# Patient Record
Sex: FEMALE | Race: WHITE | Employment: FULL TIME | URBAN - METROPOLITAN AREA
[De-identification: names, ages, dates, MRNs, and addresses within clinical notes are randomized per-mention and may not be internally consistent; named-entity substitution may affect disease eponyms.]

---

## 2019-02-13 ENCOUNTER — HOSPITAL ENCOUNTER (OUTPATIENT)
Age: 29
Discharge: HOME | End: 2019-02-13
Attending: FAMILY MEDICINE
Payer: COMMERCIAL

## 2019-02-13 VITALS
HEART RATE: 61 BPM | TEMPERATURE: 98 F | WEIGHT: 180 LBS | RESPIRATION RATE: 16 BRPM | BODY MASS INDEX: 31.89 KG/M2 | HEIGHT: 63 IN | SYSTOLIC BLOOD PRESSURE: 119 MMHG | DIASTOLIC BLOOD PRESSURE: 74 MMHG | OXYGEN SATURATION: 99 %

## 2019-02-13 DIAGNOSIS — S61.215A LACERATION OF LEFT RING FINGER WITHOUT FOREIGN BODY, NAIL DAMAGE STATUS UNSPECIFIED, INITIAL ENCOUNTER: Primary | ICD-10-CM

## 2019-02-13 PROCEDURE — 99203 OFFICE O/P NEW LOW 30 MIN: CPT

## 2019-02-13 PROCEDURE — 12001 RPR S/N/AX/GEN/TRNK 2.5CM/<: CPT

## 2019-02-13 RX ORDER — AMOXICILLIN AND CLAVULANATE POTASSIUM 875; 125 MG/1; MG/1
1 TABLET, FILM COATED ORAL 2 TIMES DAILY
Qty: 14 TABLET | Refills: 0 | Status: SHIPPED | OUTPATIENT
Start: 2019-02-13 | End: 2019-02-20

## 2019-02-14 NOTE — ED INITIAL ASSESSMENT (HPI)
Today patient was giving her dog a treat and the dog accidentally \"sliced\" left 4th finger. Stated she fainted after the bite. Denies head injury.   C/o pain

## 2019-02-14 NOTE — ED PROVIDER NOTES
Patient Seen in: [facility]    History   Patient presents with:  Bite (integumentary)    Stated Complaint: dog bite    HPI    79-year-old right-hand-dominant female was bit by her dog at 1320 this afternoon.   Patient states she against resistance. Skin: 2 cm laceration at the volar aspect of the distal left ring finger. No active bleeding noted.   Psych: Normal affect    ED Course   Labs Reviewed - No data to display       PROCEDURE NOTE: LACERATION REPAIR  Anesthesia Type: 3 mL

## (undated) NOTE — LETTER
Date & Time: 2/13/2019, 8:02 PM  Patient: Marianne Francis  Encounter Provider(s):    Usama Hester MD       To Whom It May Concern:    Michael Benítez was seen and treated in our department on 2/13/2019.  She can return to work with these limitations: